# Patient Record
Sex: FEMALE | Race: WHITE | URBAN - METROPOLITAN AREA
[De-identification: names, ages, dates, MRNs, and addresses within clinical notes are randomized per-mention and may not be internally consistent; named-entity substitution may affect disease eponyms.]

---

## 2017-11-16 ENCOUNTER — THERAPY VISIT (OUTPATIENT)
Dept: PHYSICAL THERAPY | Facility: CLINIC | Age: 27
End: 2017-11-16
Payer: COMMERCIAL

## 2017-11-16 DIAGNOSIS — M54.2 CERVICALGIA: ICD-10-CM

## 2017-11-16 DIAGNOSIS — M25.512 LEFT SHOULDER PAIN: Primary | ICD-10-CM

## 2017-11-16 PROCEDURE — 97110 THERAPEUTIC EXERCISES: CPT | Mod: GP | Performed by: PHYSICAL THERAPIST

## 2017-11-16 PROCEDURE — 97161 PT EVAL LOW COMPLEX 20 MIN: CPT | Mod: GP | Performed by: PHYSICAL THERAPIST

## 2017-11-16 PROCEDURE — 97530 THERAPEUTIC ACTIVITIES: CPT | Mod: GP | Performed by: PHYSICAL THERAPIST

## 2017-11-16 NOTE — LETTER
"[facility] PHYSICAL [facility]  [address]  [address]  [facility] [address]  [phone]    2017    Re: Jolanta Lockett   :   1990  MRN:  [id number]   REFERRING PHYSICIAN:   Peng Correia    [facility]    Date of Initial Evaluation:  2017  Visits:  Rxs Used: 1  Reason for Referral:     Left shoulder pain  Cervicalgia      Physical Therapy Initial Examination/Evaluation 2017   Jolanta Lockett is a 27 year old female referred to physical therapy by Dr. Peng Correia for treatment of L shoulder pain  with Precautions/Restrictions/MD instructions none noted   Therapist Assessment:   Clinical Impression: Pt presents to [facility] with primary complaint of L shoulder pain and neck stiffness.  Per clinical examination, pt with limitations in neck ROM.  Pt having difficulty coordinating movement to complete special tests, especially in extension and internal rotation movement. Pt did have some reproduction of pain symptoms with labral compression as well as tenderness over biceps tendon.    Pt will benefit from skilled physical therapy for stretching and postural stabilization program.   Subjective: Pain started 2 weeks ago. Pt did a lot of lifting this summer but had no injuries. Pt stated she started with some neck stiffness and then pain went to L scapula area and down L UE. Stabbing pain has gone away but still has a \"bruise-like\" pain on lateral side of L arm.   DOI/onset: 2 weeks ago   Mechanism of injury: not sure, gradual onset   DOS NA   Related PMH: tension headaches Previous treatment: none    Imaging: X-ray at time of urgent care visit 2 weeks ago and per pt was negative   Chief Complaint: Neck and shoulder pain   Pain: rest 0  /10, activity 9/10  Described as: hot, intense Alleviated by: steroids, icing  Exacerbated by: liftng arm up into abduction, shrugging shoulders " Progression of symptoms since initial onset: improving  Time of day when pain is worse: none  Sleeping: Has been able to sleep on her L side, which is improved from 2 weeks ago   Social history: 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 in [employer] ; did gardening this summer  Jolanta Lockett    Occupation: Student; goes to school for education  Job duties: prolonged siting, computer work     Current HEP/exercise regimen: minimal; sporadically running (did 10 mile race recently); physically active with job and in daily life   Patient's goals are return to yoga, have full strength and ROM in arm    Other pertinent PMH: hypothyroid General health as reported by patient: Good    Return to MD: check-up in 6 weeks    Cervical Spine Evaluation    Posture  Forward Head: present  Rounded Shoulders: unremarkable    Range of Motion  Flexion: WNL  Extension: WNL but some discomfort in L scapula  Sidebend Left: 35  Right: 20  Rotation Left: WNL  Right: WNL  Protraction: increased movement  Retraction: No symptoms with regular retraction ; some increased pain with retraction/extension       Upper Extremity Strength  Shoulder MMT Flexion Scaption ER IR   Left 4+/5 5/5 4-/5 5/5   Right 5/5 5/5 4+/5 5/5     Shoulder ROM   Extension/IR: Thumb to T4 on the R, Thumb to T4 but difficulty coordinating movement    Special Tests  Spurling's: negative  Dermatomes:neg  Myotomes: WNL  Neural tension screen: WNL    Palpation  Some tenderness over AC joint and biceps tendon; no pain with producing resisted movement of elbow flexion or cross-body action      Assessment/Plan:  Patient is a 27 year old female with cervical and left side shoulder complaints.    Patient has the following significant findings with corresponding treatment plan.                Diagnosis 1:  L shoulder pain, neck pain   Pain -  hot/cold therapy, manual therapy, self management, education and home program  Decreased ROM/flexibility - manual therapy, therapeutic exercise and home  program  Decreased strength - therapeutic exercise, therapeutic activities and home program  Impaired muscle performance - neuro re-education and home program  Decreased function - therapeutic activities and home program  Impaired posture - neuro re-education, therapeutic activities and home program  Jolanta Lockett    Therapy Evaluation Codes:   1) History comprised of:   Personal factors that impact the plan of care:      Age, Living environment, Past/current experiences, Profession and Time since onset of symptoms.    Comorbidity factors that impact the plan of care are:      Thyroid problems.     Medications impacting care: thyroid.  2) Examination of Body Systems comprised of:   Body structures and functions that impact the plan of care:      Cervical spine and Shoulder.   Activity limitations that impact the plan of care are:      Bathing, Dressing, Lifting and Sleeping.  3) Clinical presentation characteristics are:   Stable/Uncomplicated.  4) Decision-Making    Low complexity using standardized patient assessment instrument and/or measureable assessment of functional outcome.  Cumulative Therapy Evaluation is: Low complexity.    Previous and current functional limitations:  (See Goal Flow Sheet for this information)    Short term and Long term goals: (See Goal Flow Sheet for this information)     Communication ability:  Patient appears to be able to clearly communicate and understand verbal and written communication and follow directions correctly.  Treatment Explanation - The following has been discussed with the patient:   RX ordered/plan of care  Anticipated outcomes  Possible risks and side effects  This patient would benefit from PT intervention to resume normal activities.   Rehab potential is good.    Frequency:  1 X week, once daily  Duration:  for 6 weeks  Discharge Plan:  Achieve all LTG.  Independent in home treatment program.  Reach maximal therapeutic benefit.    Please refer to the daily  flowsheet for treatment today, total treatment time and time spent performing 1:1 timed codes.        Thank you for your referral.    INQUIRIES  Therapist: Isa Quiroz, PT, DPT  [facility] [address] [address]  Phone: [phone]  Fax: [phone]

## 2017-11-16 NOTE — MR AVS SNAPSHOT
"              After Visit Summary   11/16/2017    Jolanta Lockett    MRN: [id number]           Patient Information     Date Of Birth          1990        Visit Information        Provider Department      11/16/2017 4:40 PM Isa Quiroz PT [facility]        Today's Diagnoses     Left shoulder pain    -  1    Cervicalgia           Follow-ups after your visit        Your next 10 appointments already scheduled     Nov 22, 2017  4:40 PM CST   HERBERT Extremity with Isa Quiroz PT   [facility])    [address] [place] [address] [phone]              Who to contact     If you have questions or need follow up information about today's clinic visit or your schedule please contact [facility] directly at [phone].  Normal or non-critical lab and imaging results will be communicated to you by [employer], letter or phone within 4 business days after the clinic has received the results. If you do not hear from us within 7 days, please contact the clinic through [employer] or phone. If you have a critical or abnormal lab result, we will notify you by phone as soon as possible.  Submit refill requests through [employer] or call your pharmacy and they will forward the refill request to us. Please allow 3 business days for your refill to be completed.          Additional Information About Your Visit        [employer] Information     [employer] lets you send messages to your doctor, view your test results, renew your prescriptions, schedule appointments and more. To sign up, go to [email or web address] . Click on \"Log in\" on the left side of the screen, which will take you to the Welcome page. Then click on \"Sign up Now\" on the right side of the page.     You will be asked to enter the access code listed below, as well as some personal information. Please follow the " directions to create your username and password.     Your access code is: [email or web address]  Expires: 2018 11:05 AM     Your access code will  in 90 days. If you need help or a new code, please call your [place] clinic or [phone].        Care EveryWhere ID     This is your Care EveryWhere ID. This could be used by other organizations to access your [facility] medical records  [id number]         Blood Pressure from Last 3 Encounters:   No data found for BP    Weight from Last 3 Encounters:   No data found for Wt              We Performed the Following     HC PT EVAL, LOW COMPLEXITY     HERBERT INITIAL EVAL REPORT     THERAPEUTIC ACTIVITIES     THERAPEUTIC EXERCISES        Primary Care Provider Fax #    [facility] [phone]       [facility] [address] [place] [address]        Equal Access to Services     KYA MARQUIS : Hadii jameson allred Sotaj, waaxda luqadaha, qaybta kaalmada adeegyada, jesus royal . So [facility] [phone].    ATENCIÓN: Si habla español, tiene a bradford disposición servicios gratuitos de asistencia lingüística. Llame al [phone].    We comply with applicable federal civil rights laws and [place] laws. We do not discriminate on the basis of race, color, national origin, age, disability, sex, sexual orientation, or gender identity.            Thank you!     Thank you for choosing [facility] [place]  for your care. Our goal is always to provide you with excellent care. Hearing back from our patients is one way we can continue to improve our services. Please take a few minutes to complete the written survey that you may receive in the mail after your visit with us. Thank you!             Your Updated Medication List - Protect others around you: Learn how to safely use, store and throw away your medicines at www.disposemymeds.org.      Notice  As of 2017 11:59 PM    You have not been  prescribed any medications.

## 2017-11-16 NOTE — PROGRESS NOTES
"    [facility]  Physical Exam  General   [facility]        Physical Therapy Initial Examination/Evaluation November 16, 2017   Jolanta Lockett is a 27 year old female referred to physical therapy by Dr. Peng Correia for treatment of L shoulder pain  with Precautions/Restrictions/MD instructions none noted   Therapist Assessment:   Clinical Impression: Pt presents to [facility] with primary complaint of L shoulder pain and neck stiffness.  Per clinical examination, pt with limitations in neck ROM.  Pt having difficulty coordinating movement to complete special tests, especially in extension and internal rotation movement. Pt did have some reproduction of pain symptoms with labral compression as well as tenderness over biceps tendon.    Pt will benefit from skilled physical therapy for stretching and postural stabilization program.   Subjective: Pain started 2 weeks ago. Pt did a lot of lifting this summer but had no injuries. Pt stated she started with some neck stiffness and then pain went to L scapula area and down L UE. Stabbing pain has gone away but still has a \"bruise-like\" pain on lateral side of L arm.   DOI/onset: 2 weeks ago   Mechanism of injury: not sure, gradual onset   DOS NA   Related PMH: tension headaches Previous treatment: none    Imaging: X-ray at time of urgent care visit 2 weeks ago and per pt was negative   Chief Complaint: Neck and shoulder pain   Pain: rest 0  /10, activity 9/10  Described as: hot, intense Alleviated by: steroids, icing  Exacerbated by: liftng arm up into abduction, shrugging shoulders Progression of symptoms since initial onset: improving  Time of day when pain is worse: none  Sleeping: Has been able to sleep on her L side, which is improved from 2 weeks ago   Social history: 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 in [employer] ; did gardening this summer  Occupation: Student; goes to school for education  Job duties: prolonged siting, computer work     Current " HEP/exercise regimen: minimal; sporadically running (did 10 mile race recently); physically active with job and in daily life   Patient's goals are return to yoga, have full strength and ROM in arm    Other pertinent PMH: hypothyroid General health as reported by patient: Good    Return to MD: check-up in 6 weeks    Cervical Spine Evaluation    Posture  Forward Head: present  Rounded Shoulders: unremarkable    Range of Motion  Flexion: WNL  Extension: WNL but some discomfort in L scapula  Sidebend Left: 35  Right: 20  Rotation Left: WNL  Right: WNL  Protraction: increased movement  Retraction: No symptoms with regular retraction ; some increased pain with retraction/extension       Upper Extremity Strength  Shoulder MMT Flexion Scaption ER IR   Left 4+/5 5/5 4-/5 5/5   Right 5/5 5/5 4+/5 5/5     Shoulder ROM   Extension/IR: Thumb to T4 on the R, Thumb to T4 but difficulty coordinating movement    Special Tests  Spurling's: negative  Dermatomes:neg  Myotomes: WNL  Neural tension screen: WNL    Palpation  Some tenderness over AC joint and biceps tendon; no pain with producing resisted movement of elbow flexion or cross-body action      Assessment/Plan:  Patient is a 27 year old female with cervical and left side shoulder complaints.    Patient has the following significant findings with corresponding treatment plan.                Diagnosis 1:  L shoulder pain, neck pain   Pain -  hot/cold therapy, manual therapy, self management, education and home program  Decreased ROM/flexibility - manual therapy, therapeutic exercise and home program  Decreased strength - therapeutic exercise, therapeutic activities and home program  Impaired muscle performance - neuro re-education and home program  Decreased function - therapeutic activities and home program  Impaired posture - neuro re-education, therapeutic activities and home program    Therapy Evaluation Codes:   1) History comprised of:   Personal factors that impact the  plan of care:      Age, Living environment, Past/current experiences, Profession and Time since onset of symptoms.    Comorbidity factors that impact the plan of care are:      Thyroid problems.     Medications impacting care: thyroid.  2) Examination of Body Systems comprised of:   Body structures and functions that impact the plan of care:      Cervical spine and Shoulder.   Activity limitations that impact the plan of care are:      Bathing, Dressing, Lifting and Sleeping.  3) Clinical presentation characteristics are:   Stable/Uncomplicated.  4) Decision-Making    Low complexity using standardized patient assessment instrument and/or measureable assessment of functional outcome.  Cumulative Therapy Evaluation is: Low complexity.    Previous and current functional limitations:  (See Goal Flow Sheet for this information)    Short term and Long term goals: (See Goal Flow Sheet for this information)     Communication ability:  Patient appears to be able to clearly communicate and understand verbal and written communication and follow directions correctly.  Treatment Explanation - The following has been discussed with the patient:   RX ordered/plan of care  Anticipated outcomes  Possible risks and side effects  This patient would benefit from PT intervention to resume normal activities.   Rehab potential is good.    Frequency:  1 X week, once daily  Duration:  for 6 weeks  Discharge Plan:  Achieve all LTG.  Independent in home treatment program.  Reach maximal therapeutic benefit.    Please refer to the daily flowsheet for treatment today, total treatment time and time spent performing 1:1 timed codes.

## 2017-11-19 PROBLEM — M54.2 CERVICALGIA: Status: ACTIVE | Noted: 2017-11-19

## 2017-11-19 PROBLEM — M25.512 LEFT SHOULDER PAIN: Status: ACTIVE | Noted: 2017-11-19
